# Patient Record
Sex: FEMALE | Race: WHITE | NOT HISPANIC OR LATINO | Employment: FULL TIME | URBAN - METROPOLITAN AREA
[De-identification: names, ages, dates, MRNs, and addresses within clinical notes are randomized per-mention and may not be internally consistent; named-entity substitution may affect disease eponyms.]

---

## 2017-08-24 ENCOUNTER — NON-PROVIDER VISIT (OUTPATIENT)
Dept: OCCUPATIONAL MEDICINE | Facility: CLINIC | Age: 52
End: 2017-08-24

## 2017-08-24 DIAGNOSIS — Z02.89 ENCOUNTER FOR OCCUPATIONAL HEALTH EXAMINATION: ICD-10-CM

## 2017-08-24 DIAGNOSIS — Z02.1 PRE-EMPLOYMENT DRUG SCREENING: ICD-10-CM

## 2017-08-24 LAB
AMP AMPHETAMINE: NORMAL
COC COCAINE: NORMAL
INT CON NEG: NORMAL
INT CON POS: NORMAL
MET METHAMPHETAMINES: NORMAL
OPI OPIATES: NORMAL
PCP PHENCYCLIDINE: NORMAL
POC DRUG COMMENT 753798-OCCUPATIONAL HEALTH: NEGATIVE
THC: NORMAL

## 2017-08-24 PROCEDURE — 80305 DRUG TEST PRSMV DIR OPT OBS: CPT | Performed by: PREVENTIVE MEDICINE

## 2023-07-26 ENCOUNTER — TELEPHONE (OUTPATIENT)
Dept: SCHEDULING | Facility: IMAGING CENTER | Age: 58
End: 2023-07-26

## 2023-08-02 ENCOUNTER — OFFICE VISIT (OUTPATIENT)
Dept: MEDICAL GROUP | Facility: CLINIC | Age: 58
End: 2023-08-02

## 2023-08-02 VITALS
DIASTOLIC BLOOD PRESSURE: 82 MMHG | HEIGHT: 65 IN | BODY MASS INDEX: 25.46 KG/M2 | OXYGEN SATURATION: 96 % | SYSTOLIC BLOOD PRESSURE: 124 MMHG | WEIGHT: 152.8 LBS | RESPIRATION RATE: 16 BRPM | TEMPERATURE: 98.7 F | HEART RATE: 65 BPM

## 2023-08-02 DIAGNOSIS — Z82.49 FAMILY HISTORY OF MI (MYOCARDIAL INFARCTION): ICD-10-CM

## 2023-08-02 DIAGNOSIS — R53.83 OTHER FATIGUE: ICD-10-CM

## 2023-08-02 DIAGNOSIS — E78.49 OTHER HYPERLIPIDEMIA: ICD-10-CM

## 2023-08-02 DIAGNOSIS — Z12.31 ENCOUNTER FOR SCREENING MAMMOGRAM FOR BREAST CANCER: ICD-10-CM

## 2023-08-02 DIAGNOSIS — Z12.11 COLON CANCER SCREENING: ICD-10-CM

## 2023-08-02 DIAGNOSIS — E16.2 HYPOGLYCEMIA: ICD-10-CM

## 2023-08-02 DIAGNOSIS — Z13.21 ENCOUNTER FOR VITAMIN DEFICIENCY SCREENING: ICD-10-CM

## 2023-08-02 DIAGNOSIS — Z11.59 NEED FOR HEPATITIS C SCREENING TEST: ICD-10-CM

## 2023-08-02 DIAGNOSIS — R53.81 MALAISE: ICD-10-CM

## 2023-08-02 PROCEDURE — 3074F SYST BP LT 130 MM HG: CPT | Performed by: PHYSICIAN ASSISTANT

## 2023-08-02 PROCEDURE — 99203 OFFICE O/P NEW LOW 30 MIN: CPT | Performed by: PHYSICIAN ASSISTANT

## 2023-08-02 PROCEDURE — 3079F DIAST BP 80-89 MM HG: CPT | Performed by: PHYSICIAN ASSISTANT

## 2023-08-02 RX ORDER — BUPRENORPHINE AND NALOXONE 8; 2 MG/1; MG/1
FILM, SOLUBLE BUCCAL; SUBLINGUAL
COMMUNITY
Start: 2023-07-27

## 2023-08-02 ASSESSMENT — PATIENT HEALTH QUESTIONNAIRE - PHQ9: CLINICAL INTERPRETATION OF PHQ2 SCORE: 0

## 2023-08-02 NOTE — PROGRESS NOTES
"cc:  fatigue    Subjective:     Patience Cuellar is a 58 y.o. female presenting for fatigue      Patient presents to the office for fatigue.  She states that she has also been feeling sweaty.  It has happened in the last 6 months.  It does not happen very often but it is quite concerning when it does happen.  She states that the first time this happened, it felt like she \"was going to die\".  She grabbed her tea with sugar and it helped.   She states that she used her sons glucometer at a time she was having an episode and her glucose was 51.   She states that it is usually early in the morning and it is before she eats when it happens. Patient states that she cannot tolerate gluten.    Review of systems:  See above.   Denies any symptoms unless previously indicated.        Current Outpatient Medications:     Buprenorphine HCl-Naloxone HCl 8-2 MG FILM, DISSOLVE 3 FILMS UNDER THE TONGUE EVERY DAY. MAX 3 FILMS IN 24 HOURS, Disp: , Rfl:     Misc Natural Products (YUMVS BEET ROOT-TART CHERRY PO), Take  by mouth., Disp: , Rfl:     Probiotic Product (PROBIOTIC DAILY PO), Take  by mouth., Disp: , Rfl:     Allergies, past medical history, past surgical history, family history, social history reviewed and updated    Objective:     Vitals: /82 (BP Location: Left arm, Patient Position: Sitting, BP Cuff Size: Adult)   Pulse 65   Temp 37.1 °C (98.7 °F) (Temporal)   Resp 16   Ht 1.651 m (5' 5\")   Wt 69.3 kg (152 lb 12.8 oz)   LMP 01/01/2010   SpO2 96%   BMI 25.43 kg/m²   General: Alert, pleasant, NAD  EYES:   PERRL, EOMI, no icterus or pallor.  Conjunctivae and lids normal.   HENT:  Normocephalic.  External ears normal.  Neck supple.   Heart: Regular rate and rhythm.  S1 and S2 normal.  No murmurs appreciated.  Respiratory: Normal respiratory effort.  Clear to auscultation bilaterally.  Abdomen: Not obese  Skin: Warm, dry, no rashes.  Musculoskeletal: Gait is normal.  Moves all extremities well.    Extremities: " Normal range of motion all extremities.   Neurological: No tremors, sensation grossly intact,  CN2-12 intact.  Psych:  Affect/mood is normal, judgement is good, memory is intact, grooming is appropriate.    Assessment/Plan:     Patience was seen today for establish care and blood sugar problem.    Diagnoses and all orders for this visit:    Other fatigue  -     TSH WITH REFLEX TO FT4; Future  -     Comp Metabolic Panel; Future  -     Lipid Profile; Future  -     FERRITIN; Future  -     FOLATE; Future  -     VITAMIN B12; Future  -     IRON/TOTAL IRON BIND; Future  -     CBC WITH DIFFERENTIAL; Future  Malaise  -     TSH WITH REFLEX TO FT4; Future  -     Comp Metabolic Panel; Future  -     Lipid Profile; Future  -     FERRITIN; Future  -     FOLATE; Future  -     VITAMIN B12; Future  -     IRON/TOTAL IRON BIND; Future  -     CBC WITH DIFFERENTIAL; Future  Hypoglycemia  -     HEMOGLOBIN A1C; Future  Family history of MI (myocardial infarction)  -     Comp Metabolic Panel; Future  -     Lipid Profile; Future  Other hyperlipidemia  -     Comp Metabolic Panel; Future  -     Lipid Profile; Future    We will obtain labs to evaluate further.  Believe most likely hypoglycemia.  Patient's diet changed significantly around 6 months when she removed gluten from her diet.  Difficult to know what glucose levels were before this but it is possible that removing gluten contributed to lower glucose readings.  We will follow-up in 2 to 4 weeks with labs and will have patient eat smaller frequent meals.    Colon cancer screening  -     COLOGUARD (FIT DNA)  Encounter for vitamin deficiency screening  -     VITAMIN D,25 HYDROXY (DEFICIENCY); Future  Need for hepatitis C screening test  -     HEP C VIRUS ANTIBODY; Future  Encounter for screening mammogram for breast cancer  -     MA-SCREENING MAMMO BILAT W/TOMOSYNTHESIS W/CAD; Future    Routine screenings ordered to evaluate further.        Return in about 4 weeks (around 8/30/2023), or if  symptoms worsen or fail to improve, for 2-4 labs.    Please note that this dictation was created using voice recognition software. I have made every reasonable attempt to correct obvious errors, but expect that there are errors of grammar and possible content that I did not discover before finalizing note.

## 2023-08-05 ENCOUNTER — HOSPITAL ENCOUNTER (OUTPATIENT)
Dept: LAB | Facility: MEDICAL CENTER | Age: 58
End: 2023-08-05
Attending: PHYSICIAN ASSISTANT
Payer: OTHER MISCELLANEOUS

## 2023-08-05 DIAGNOSIS — E16.2 HYPOGLYCEMIA: ICD-10-CM

## 2023-08-05 DIAGNOSIS — Z11.59 NEED FOR HEPATITIS C SCREENING TEST: ICD-10-CM

## 2023-08-05 DIAGNOSIS — Z82.49 FAMILY HISTORY OF MI (MYOCARDIAL INFARCTION): ICD-10-CM

## 2023-08-05 DIAGNOSIS — R53.83 OTHER FATIGUE: ICD-10-CM

## 2023-08-05 DIAGNOSIS — R53.81 MALAISE: ICD-10-CM

## 2023-08-05 DIAGNOSIS — E78.49 OTHER HYPERLIPIDEMIA: ICD-10-CM

## 2023-08-05 DIAGNOSIS — Z13.21 ENCOUNTER FOR VITAMIN DEFICIENCY SCREENING: ICD-10-CM

## 2023-08-05 LAB
25(OH)D3 SERPL-MCNC: 36 NG/ML (ref 30–100)
ALBUMIN SERPL BCP-MCNC: 4.5 G/DL (ref 3.2–4.9)
ALBUMIN/GLOB SERPL: 1.7 G/DL
ALP SERPL-CCNC: 58 U/L (ref 30–99)
ALT SERPL-CCNC: 29 U/L (ref 2–50)
ANION GAP SERPL CALC-SCNC: 8 MMOL/L (ref 7–16)
AST SERPL-CCNC: 20 U/L (ref 12–45)
BASOPHILS # BLD AUTO: 1 % (ref 0–1.8)
BASOPHILS # BLD: 0.08 K/UL (ref 0–0.12)
BILIRUB SERPL-MCNC: 0.3 MG/DL (ref 0.1–1.5)
BUN SERPL-MCNC: 5 MG/DL (ref 8–22)
CALCIUM ALBUM COR SERPL-MCNC: 9.4 MG/DL (ref 8.5–10.5)
CALCIUM SERPL-MCNC: 9.8 MG/DL (ref 8.5–10.5)
CHLORIDE SERPL-SCNC: 104 MMOL/L (ref 96–112)
CHOLEST SERPL-MCNC: 200 MG/DL (ref 100–199)
CO2 SERPL-SCNC: 28 MMOL/L (ref 20–33)
CREAT SERPL-MCNC: 0.74 MG/DL (ref 0.5–1.4)
EOSINOPHIL # BLD AUTO: 0.29 K/UL (ref 0–0.51)
EOSINOPHIL NFR BLD: 3.5 % (ref 0–6.9)
ERYTHROCYTE [DISTWIDTH] IN BLOOD BY AUTOMATED COUNT: 43.3 FL (ref 35.9–50)
EST. AVERAGE GLUCOSE BLD GHB EST-MCNC: 126 MG/DL
FERRITIN SERPL-MCNC: 116 NG/ML (ref 10–291)
FOLATE SERPL-MCNC: 7.6 NG/ML
GFR SERPLBLD CREATININE-BSD FMLA CKD-EPI: 93 ML/MIN/1.73 M 2
GLOBULIN SER CALC-MCNC: 2.7 G/DL (ref 1.9–3.5)
GLUCOSE SERPL-MCNC: 102 MG/DL (ref 65–99)
HBA1C MFR BLD: 6 % (ref 4–5.6)
HCT VFR BLD AUTO: 49.8 % (ref 37–47)
HCV AB SER QL: NORMAL
HDLC SERPL-MCNC: 35 MG/DL
HGB BLD-MCNC: 16.2 G/DL (ref 12–16)
IMM GRANULOCYTES # BLD AUTO: 0.01 K/UL (ref 0–0.11)
IMM GRANULOCYTES NFR BLD AUTO: 0.1 % (ref 0–0.9)
IRON SATN MFR SERPL: 44 % (ref 15–55)
IRON SERPL-MCNC: 130 UG/DL (ref 40–170)
LDLC SERPL CALC-MCNC: 122 MG/DL
LYMPHOCYTES # BLD AUTO: 3.02 K/UL (ref 1–4.8)
LYMPHOCYTES NFR BLD: 36.1 % (ref 22–41)
MCH RBC QN AUTO: 30.3 PG (ref 27–33)
MCHC RBC AUTO-ENTMCNC: 32.5 G/DL (ref 32.2–35.5)
MCV RBC AUTO: 93.1 FL (ref 81.4–97.8)
MONOCYTES # BLD AUTO: 0.58 K/UL (ref 0–0.85)
MONOCYTES NFR BLD AUTO: 6.9 % (ref 0–13.4)
NEUTROPHILS # BLD AUTO: 4.38 K/UL (ref 1.82–7.42)
NEUTROPHILS NFR BLD: 52.4 % (ref 44–72)
NRBC # BLD AUTO: 0 K/UL
NRBC BLD-RTO: 0 /100 WBC (ref 0–0.2)
PLATELET # BLD AUTO: 263 K/UL (ref 164–446)
PMV BLD AUTO: 9.7 FL (ref 9–12.9)
POTASSIUM SERPL-SCNC: 4.9 MMOL/L (ref 3.6–5.5)
PROT SERPL-MCNC: 7.2 G/DL (ref 6–8.2)
RBC # BLD AUTO: 5.35 M/UL (ref 4.2–5.4)
SODIUM SERPL-SCNC: 140 MMOL/L (ref 135–145)
TIBC SERPL-MCNC: 293 UG/DL (ref 250–450)
TRIGL SERPL-MCNC: 217 MG/DL (ref 0–149)
TSH SERPL DL<=0.005 MIU/L-ACNC: 2.73 UIU/ML (ref 0.38–5.33)
UIBC SERPL-MCNC: 163 UG/DL (ref 110–370)
VIT B12 SERPL-MCNC: 386 PG/ML (ref 211–911)
WBC # BLD AUTO: 8.4 K/UL (ref 4.8–10.8)

## 2023-08-05 PROCEDURE — 86803 HEPATITIS C AB TEST: CPT

## 2023-08-05 PROCEDURE — 83540 ASSAY OF IRON: CPT

## 2023-08-05 PROCEDURE — 80053 COMPREHEN METABOLIC PANEL: CPT

## 2023-08-05 PROCEDURE — 85025 COMPLETE CBC W/AUTO DIFF WBC: CPT

## 2023-08-05 PROCEDURE — 83036 HEMOGLOBIN GLYCOSYLATED A1C: CPT

## 2023-08-05 PROCEDURE — 82746 ASSAY OF FOLIC ACID SERUM: CPT

## 2023-08-05 PROCEDURE — 36415 COLL VENOUS BLD VENIPUNCTURE: CPT

## 2023-08-05 PROCEDURE — 84443 ASSAY THYROID STIM HORMONE: CPT

## 2023-08-05 PROCEDURE — 82607 VITAMIN B-12: CPT

## 2023-08-05 PROCEDURE — 80061 LIPID PANEL: CPT

## 2023-08-05 PROCEDURE — 82728 ASSAY OF FERRITIN: CPT

## 2023-08-05 PROCEDURE — 83550 IRON BINDING TEST: CPT

## 2023-08-05 PROCEDURE — 82306 VITAMIN D 25 HYDROXY: CPT

## 2023-09-11 ENCOUNTER — OFFICE VISIT (OUTPATIENT)
Dept: MEDICAL GROUP | Facility: CLINIC | Age: 58
End: 2023-09-11

## 2023-09-11 VITALS
SYSTOLIC BLOOD PRESSURE: 112 MMHG | WEIGHT: 149.03 LBS | TEMPERATURE: 98.7 F | OXYGEN SATURATION: 97 % | HEART RATE: 84 BPM | DIASTOLIC BLOOD PRESSURE: 70 MMHG | HEIGHT: 65 IN | RESPIRATION RATE: 16 BRPM | BODY MASS INDEX: 24.83 KG/M2

## 2023-09-11 DIAGNOSIS — E78.49 OTHER HYPERLIPIDEMIA: ICD-10-CM

## 2023-09-11 DIAGNOSIS — R14.0 BLOATED ABDOMEN: ICD-10-CM

## 2023-09-11 DIAGNOSIS — R73.03 PREDIABETES: ICD-10-CM

## 2023-09-11 PROCEDURE — 3078F DIAST BP <80 MM HG: CPT | Performed by: PHYSICIAN ASSISTANT

## 2023-09-11 PROCEDURE — 99214 OFFICE O/P EST MOD 30 MIN: CPT | Performed by: PHYSICIAN ASSISTANT

## 2023-09-11 PROCEDURE — 3074F SYST BP LT 130 MM HG: CPT | Performed by: PHYSICIAN ASSISTANT

## 2023-09-11 ASSESSMENT — FIBROSIS 4 INDEX: FIB4 SCORE: 0.83

## 2023-09-11 NOTE — PROGRESS NOTES
"cc:  hyperlipidemia    Subjective:     Patience Cuellar is a 58 y.o. female presenting for hyperlipidemia      Patient presents to the office for hyperlipidemia.  Patient states that she did have a repeat episode.  She states that it did happen when she had been without food for an extended period of time.     Patient states that she has been feeling gassy.  She states that this occurs with all foods.  She denies having heartburn.  She has been more constipated.      Review of systems:  See above.   Denies any symptoms unless previously indicated.        Current Outpatient Medications:     Buprenorphine HCl-Naloxone HCl 8-2 MG FILM, DISSOLVE 3 FILMS UNDER THE TONGUE EVERY DAY. MAX 3 FILMS IN 24 HOURS, Disp: , Rfl:     Misc Natural Products (YUMVS BEET ROOT-TART CHERRY PO), Take  by mouth., Disp: , Rfl:     Probiotic Product (PROBIOTIC DAILY PO), Take  by mouth., Disp: , Rfl:     Allergies, past medical history, past surgical history, family history, social history reviewed and updated    Objective:     Vitals: /70   Pulse 84   Temp 37.1 °C (98.7 °F) (Temporal)   Resp 16   Ht 1.651 m (5' 5\")   Wt 67.6 kg (149 lb 0.5 oz)   SpO2 97%   BMI 24.80 kg/m²   General: Alert, pleasant, NAD  EYES:   PERRL, EOMI, no icterus or pallor.  Conjunctivae and lids normal.   HENT:  Normocephalic.  External ears normal.  Neck supple.    Respiratory: Normal respiratory effort.    Abdomen: Not obese  Skin: Warm, dry, no rashes.  Musculoskeletal: Gait is normal.  Moves all extremities well.    Extremities: normal range of motion all extremities.   Neurological: No tremors, sensation grossly intact,  CN2-12 intact.  Psych:  Affect/mood is normal, judgement is good, memory is intact, grooming is appropriate.     Latest Reference Range & Units 08/05/23 07:34   WBC 4.8 - 10.8 K/uL 8.4   RBC 4.20 - 5.40 M/uL 5.35   Hemoglobin 12.0 - 16.0 g/dL 16.2 (H)   Hematocrit 37.0 - 47.0 % 49.8 (H)   MCV 81.4 - 97.8 fL 93.1   MCH 27.0 - 33.0 pg " 30.3   MCHC 32.2 - 35.5 g/dL 32.5   RDW 35.9 - 50.0 fL 43.3   Platelet Count 164 - 446 K/uL 263   MPV 9.0 - 12.9 fL 9.7   Neutrophils-Polys 44.00 - 72.00 % 52.40   Neutrophils (Absolute) 1.82 - 7.42 K/uL 4.38   Lymphocytes 22.00 - 41.00 % 36.10   Lymphs (Absolute) 1.00 - 4.80 K/uL 3.02   Monocytes 0.00 - 13.40 % 6.90   Monos (Absolute) 0.00 - 0.85 K/uL 0.58   Eosinophils 0.00 - 6.90 % 3.50   Eos (Absolute) 0.00 - 0.51 K/uL 0.29   Basophils 0.00 - 1.80 % 1.00   Baso (Absolute) 0.00 - 0.12 K/uL 0.08   Immature Granulocytes 0.00 - 0.90 % 0.10   Immature Granulocytes (abs) 0.00 - 0.11 K/uL 0.01   Nucleated RBC 0.00 - 0.20 /100 WBC 0.00   NRBC (Absolute) K/uL 0.00   Sodium 135 - 145 mmol/L 140   Potassium 3.6 - 5.5 mmol/L 4.9   Chloride 96 - 112 mmol/L 104   Co2 20 - 33 mmol/L 28   Anion Gap 7.0 - 16.0  8.0   Glucose 65 - 99 mg/dL 102 (H)   Bun 8 - 22 mg/dL 5 (L)   Creatinine 0.50 - 1.40 mg/dL 0.74   GFR (CKD-EPI) >60 mL/min/1.73 m 2 93   Calcium 8.5 - 10.5 mg/dL 9.8   Correct Calcium 8.5 - 10.5 mg/dL 9.4   AST(SGOT) 12 - 45 U/L 20   ALT(SGPT) 2 - 50 U/L 29   Alkaline Phosphatase 30 - 99 U/L 58   Total Bilirubin 0.1 - 1.5 mg/dL 0.3   Albumin 3.2 - 4.9 g/dL 4.5   Total Protein 6.0 - 8.2 g/dL 7.2   Globulin 1.9 - 3.5 g/dL 2.7   A-G Ratio g/dL 1.7   Iron 40 - 170 ug/dL 130   Total Iron Binding 250 - 450 ug/dL 293   % Saturation 15 - 55 % 44   Unsat Iron Binding 110 - 370 ug/dL 163   Glycohemoglobin 4.0 - 5.6 % 6.0 (H)   Estim. Avg Glu mg/dL 126   Cholesterol,Tot 100 - 199 mg/dL 200 (H)   Triglycerides 0 - 149 mg/dL 217 (H)   HDL >=40 mg/dL 35 !   LDL <100 mg/dL 122 (H)   25-Hydroxy   Vitamin D 25 30 - 100 ng/mL 36   Ferritin 10.0 - 291.0 ng/mL 116.0   Folate -Folic Acid >4.0 ng/mL 7.6   Vitamin B12 -True Cobalamin 211 - 911 pg/mL 386   TSH 0.380 - 5.330 uIU/mL 2.730   Hepatitis C Antibody Non-Reactive  Non-Reactive   (H): Data is abnormally high  (L): Data is abnormally low  !: Data is abnormal    Assessment/Plan:     Patience  was seen today for lab results.    Diagnoses and all orders for this visit:    Prediabetes  -     Lipid Profile; Future  -     Comp Metabolic Panel; Future  -     HEMOGLOBIN A1C; Future    Patient's labs do show that she is prediabetic.  Symptoms most likely coming from low sugar levels.  We will need to keep a close eye on this especially due to patient's symptoms.  We will repeat labs in 3 months.  Advised patient to adjust diet with lean proteins and vegetables.  As fruits contribute to symptoms, recommend that she decrease her fruit intake as well as her sugar intake.    Other hyperlipidemia    Stable.  Again advised patient to make dietary changes including lean proteins and vegetables.    Bloated abdomen    It is possible that patient may have an intolerance to dairy.  We will stop her dairy intake for short period of time follow-up in 4 weeks and reevaluate.  Patient is in agreement with plan and will report back any issues.            Return in about 4 weeks (around 10/9/2023), or if symptoms worsen or fail to improve.    Please note that this dictation was created using voice recognition software. I have made every reasonable attempt to correct obvious errors, but expect that there are errors of grammar and possible content that I did not discover before finalizing note.

## 2024-07-30 ENCOUNTER — APPOINTMENT (OUTPATIENT)
Dept: MEDICAL GROUP | Facility: CLINIC | Age: 59
End: 2024-07-30